# Patient Record
Sex: FEMALE | Race: WHITE
[De-identification: names, ages, dates, MRNs, and addresses within clinical notes are randomized per-mention and may not be internally consistent; named-entity substitution may affect disease eponyms.]

---

## 2017-11-05 ENCOUNTER — HOSPITAL ENCOUNTER (EMERGENCY)
Dept: HOSPITAL 10 - FTE | Age: 27
Discharge: HOME | End: 2017-11-05
Payer: COMMERCIAL

## 2017-11-05 VITALS — BODY MASS INDEX: 35.71 KG/M2 | WEIGHT: 154.32 LBS | HEIGHT: 55 IN

## 2017-11-05 DIAGNOSIS — R51: Primary | ICD-10-CM

## 2017-11-05 PROCEDURE — 99283 EMERGENCY DEPT VISIT LOW MDM: CPT

## 2017-11-05 NOTE — ERD
ER Documentation


Chief Complaint


Chief Complaint


POSSIBLE ALLERGIC REACTION TO NEW MEDICATION





HPI


Otherwise healthy 27-year-old female presenting with a chief complaint of 

headache and general fatigue.  Patient was evaluated yesterday diagnosed with 

vaginitis and given a prescription for metronidazole.  Patient believes her 

symptoms are secondary to metronidazole.  Is requesting work note.  States that 

she took Tylenol with minimal relief yesterday.  Has not taken any additional 

medications today to relieve symptoms.  Has not taken this medication before.  

No aggravating or alleviating factors.  Denies chest pain, body aches, 

meningismus, fever, chills, or abdominal pain.  Patient has no other complaints 

and describes no other associated manifestations.  Nursing notes have been 

reviewed and are consistent with history given.





ROS


All systems reviewed and are negative except as per history of present illness.





Medications


Home Meds


Active Scripts


Clindamycin Hcl* (Clindamycin Hcl*) 300 Mg Capsule, 300 MG PO BID for 7 Days, 

CAP


   Prov:YORDAN PEREZ PA-C         11/5/17





PMhx/Soc


Medical and Surgical Hx:  pt denies Medical Hx, pt denies Surgical Hx


Hx Alcohol Use:  No


Hx Substance Use:  No





Physical Exam


Vitals





Vital Signs








  Date Time  Temp Pulse Resp B/P Pulse Ox O2 Delivery O2 Flow Rate FiO2


 


11/5/17 17:32 98.1 71 18 149/71 98   








Physical Exam


Const: Healthy appearing 27-year-old female no acute distress


Head:   Atraumatic 


Eyes:    Normal Conjunctiva.  PERRLA, EOMI bilaterally.  Ophthalmoscope exam 

unremarkable.


ENT:    Normal External Ears, Nose and Mouth.


Neck:               Full range of motion..~ No meningismus.  No lymphadenopathy.


Resp:    Clear to auscultation bilaterally


Cardio:    Regular rate and rhythm, no murmurs.  Cap refill less than 2 

seconds.  Radial pulses 2+ bilaterally.


Abd:    Soft, non tender, non distended. Normal bowel sounds.  No suprapubic 

tenderness.


Skin:    No petechiae or rashes


Back:    No midline or flank tenderness.  No CVA tenderness.


Ext:    No cyanosis, or edema


Neur:    Awake and alert.  Neurovascularly intact.


Psych:    Normal Mood and Affect





Procedures/MDM


Patient presents with chief complaints of headache and general fatigue.  

Patient states that she was prescribed metronidazole and believes it is because 

that medication.  Patient is wanting a different medication and a work note.  

Denies alcohol use or drug abuse.  Patient will be given clindamycin with 

instructions for follow-up with PCP.  I have no concern for anemia, serious 

bacterial infection including PID, systemic involvement, or serious drug 

reaction or endangerment of airway.  I have spoke with the patient regarding 

their condition and future management. They have verbally responded that they 

understand their status and treatment plan. The patients vitals are stable, 

and their current condition is appropriate for discharge. The patient will be 

given discharge instructions with return precautions.





Discharge medications: Clindamycin 300 mg p.o.





Departure


Diagnosis:  


 Primary Impression:  


 Adverse drug effect


 Encounter type:  initial encounter  Qualified Code:  T88.7XXA - Adverse effect 

of drug, initial encounter


Condition:  Stable





Additional Instructions:  


Follow up with your PCP within the next 1-3 days for a more thorough evaluation 

and a possible referral to a specialist. Return the the emergency department 

immediately if symptoms worsen or change. If you have any questions regarding 

medications, ask your pharmacist or us before you leave. If any adverse 

reactions occur while taking your medications, discontinue the treatment and 

return to the emergency department immediately.  Take your medications as 

directed, and complete the entire course of treatment.











YORDAN PEREZ PA-C Nov 5, 2017 18:07

## 2019-01-11 ENCOUNTER — HOSPITAL ENCOUNTER (EMERGENCY)
Dept: HOSPITAL 91 - FTE | Age: 29
Discharge: HOME | End: 2019-01-11
Payer: COMMERCIAL

## 2019-01-11 ENCOUNTER — HOSPITAL ENCOUNTER (EMERGENCY)
Dept: HOSPITAL 10 - FTE | Age: 29
Discharge: HOME | End: 2019-01-11
Payer: COMMERCIAL

## 2019-01-11 VITALS — HEIGHT: 55 IN | BODY MASS INDEX: 36.22 KG/M2 | WEIGHT: 156.53 LBS

## 2019-01-11 VITALS — SYSTOLIC BLOOD PRESSURE: 125 MMHG | RESPIRATION RATE: 20 BRPM | DIASTOLIC BLOOD PRESSURE: 75 MMHG | HEART RATE: 65 BPM

## 2019-01-11 DIAGNOSIS — R42: Primary | ICD-10-CM

## 2019-01-11 LAB
ADD MAN DIFF?: NO
ADD UMIC: YES
ALANINE AMINOTRANSFERASE: 18 IU/L (ref 13–69)
ALBUMIN/GLOBULIN RATIO: 1.4
ALBUMIN: 5.2 G/DL (ref 3.3–4.9)
ALKALINE PHOSPHATASE: 76 IU/L (ref 42–121)
ANION GAP: 14 (ref 5–13)
ASPARTATE AMINO TRANSFERASE: 31 IU/L (ref 15–46)
BASOPHIL #: 0 10^3/UL (ref 0–0.1)
BASOPHILS %: 0.3 % (ref 0–2)
BILIRUBIN,DIRECT: 0 MG/DL (ref 0–0.2)
BILIRUBIN,TOTAL: 0.2 MG/DL (ref 0.2–1.3)
BLOOD UREA NITROGEN: 14 MG/DL (ref 7–20)
CALCIUM: 10.2 MG/DL (ref 8.4–10.2)
CARBON DIOXIDE: 27 MMOL/L (ref 21–31)
CHLORIDE: 101 MMOL/L (ref 97–110)
CREATININE: 0.61 MG/DL (ref 0.44–1)
EOSINOPHILS #: 0.1 10^3/UL (ref 0–0.5)
EOSINOPHILS %: 1.5 % (ref 0–7)
GLOBULIN: 3.7 G/DL (ref 1.3–3.2)
GLUCOSE: 93 MG/DL (ref 70–220)
HEMATOCRIT: 42.3 % (ref 37–47)
HEMOGLOBIN: 13.8 G/DL (ref 12–16)
IMMATURE GRANS #M: 0.03 10^3/UL (ref 0–0.03)
IMMATURE GRANS % (M): 0.3 % (ref 0–0.43)
LYMPHOCYTES #: 2.2 10^3/UL (ref 0.8–2.9)
LYMPHOCYTES %: 25.9 % (ref 15–51)
MEAN CORPUSCULAR HEMOGLOBIN: 29.4 PG (ref 29–33)
MEAN CORPUSCULAR HGB CONC: 32.6 G/DL (ref 32–37)
MEAN CORPUSCULAR VOLUME: 90 FL (ref 82–101)
MEAN PLATELET VOLUME: 11.7 FL (ref 7.4–10.4)
MONOCYTE #: 0.4 10^3/UL (ref 0.3–0.9)
MONOCYTES %: 5 % (ref 0–11)
NEUTROPHIL #: 5.7 10^3/UL (ref 1.6–7.5)
NEUTROPHILS %: 67 % (ref 39–77)
NUCLEATED RED BLOOD CELLS #: 0 10^3/UL (ref 0–0)
NUCLEATED RED BLOOD CELLS%: 0 /100WBC (ref 0–0)
PLATELET COUNT: 269 10^3/UL (ref 140–415)
POTASSIUM: 3.9 MMOL/L (ref 3.5–5.1)
RED BLOOD COUNT: 4.7 10^6/UL (ref 4.2–5.4)
RED CELL DISTRIBUTION WIDTH: 11.9 % (ref 11.5–14.5)
SODIUM: 142 MMOL/L (ref 135–144)
TOTAL PROTEIN: 8.9 G/DL (ref 6.1–8.1)
UR ASCORBIC ACID: NEGATIVE MG/DL
UR BILIRUBIN (DIP): NEGATIVE MG/DL
UR BLOOD (DIP): NEGATIVE MG/DL
UR CLARITY: (no result)
UR COLOR: YELLOW
UR GLUCOSE (DIP): NEGATIVE MG/DL
UR KETONES (DIP): (no result) MG/DL
UR LEUKOCYTE ESTERASE (DIP): (no result) LEU/UL
UR MUCUS: (no result) /HPF
UR NITRITE (DIP): NEGATIVE MG/DL
UR PH (DIP): 5 (ref 5–9)
UR RBC: 0 /HPF (ref 0–5)
UR SPECIFIC GRAVITY (DIP): 1.03 (ref 1–1.03)
UR SQUAMOUS EPITHELIAL CELL: (no result) /HPF
UR TOTAL PROTEIN (DIP): NEGATIVE MG/DL
UR UROBILINOGEN (DIP): NEGATIVE MG/DL
UR WBC: 2 /HPF (ref 0–5)
WHITE BLOOD COUNT: 8.6 10^3/UL (ref 4.8–10.8)

## 2019-01-11 PROCEDURE — 70450 CT HEAD/BRAIN W/O DYE: CPT

## 2019-01-11 PROCEDURE — 93005 ELECTROCARDIOGRAM TRACING: CPT

## 2019-01-11 PROCEDURE — 80053 COMPREHEN METABOLIC PANEL: CPT

## 2019-01-11 PROCEDURE — 85025 COMPLETE CBC W/AUTO DIFF WBC: CPT

## 2019-01-11 PROCEDURE — 81001 URINALYSIS AUTO W/SCOPE: CPT

## 2019-01-11 PROCEDURE — 99285 EMERGENCY DEPT VISIT HI MDM: CPT

## 2019-01-11 PROCEDURE — 81025 URINE PREGNANCY TEST: CPT

## 2019-01-11 RX ADMIN — CEPHALEXIN 1 MG: 500 CAPSULE ORAL at 19:53

## 2019-01-11 RX ADMIN — MECLIZINE 1 MG: 12.5 TABLET ORAL at 18:37

## 2019-01-11 NOTE — ERD
ER Documentation


Chief Complaint


Chief Complaint





DIZZINESS X 4 DAYS





HPI


28-year-old female presents with dizziness for last 4 days.  She describes it as


a spinning type dizziness.  She has mild nausea.  She denies fevers, vomiting, s


hortness breath or chest pain.  She has has a mild bitemporal headache.  She 


denies any history of trauma, additional symptoms.





ROS


All systems reviewed and are negative except as per history of present illness.





Medications


Home Meds


Active Scripts


Meclizine Hcl* (Meclizine Hcl*) 25 Mg Tablet, 25 MG PO Q8H PRN for DIZZINESS, 


#14 TAB


   Prov:GERA DAWSON MD         1/11/19


Cephalexin* (Keflex*) 500 Mg Capsule, 500 MG PO QID for 5 Days, CAP


   Prov:GERA DASWON MD         1/11/19


Clindamycin Hcl* (Clindamycin Hcl*) 300 Mg Capsule, 300 MG PO BID for 7 Days, 


CAP


   Prov:YORDAN PEREZ PA-C         11/5/17





PMhx/Soc


Hx Alcohol Use:  No


Hx Substance Use:  No


Hx Tobacco Use:  No


Smoking Status:  Never smoker





FmHx


Family History:  No diabetes, No coronary disease, No other





Physical Exam


Vitals





Vital Signs


  Date      Temp  Pulse  Resp  B/P (MAP)   Pulse Ox  O2          O2 Flow    FiO2


Time                                                 Delivery    Rate


   1/11/19  98.1     71    18      123/56        99


     16:03                           (78)





Physical Exam


Const:   No acute distress


Head:   Atraumatic 


Eyes:    Normal Conjunctiva


ENT:    Normal External Ears, Nose and Mouth.  TMs and oropharynx normal.


Neck:               Full range of motion. No meningismus.


Resp:   Clear to auscultation bilaterally


Cardio:   Regular rate and rhythm, no murmurs


Abd:    Soft, non tender, non distended. Normal bowel sounds


Skin:   No petechiae or rashes


Back:   No midline or flank tenderness


Ext:    No cyanosis, or edema


Neur:   Awake and alert.  No significant reproducible vertigo.  No cerebellar 


signs are pronator drift.  Normal gait.


Psych:    Normal Mood and Affect


Result Diagram:  


1/11/19 1829 1/11/19 1829





Results 24 hrs





Laboratory Tests


Test
                             1/11/19
18:19    1/11/19
18:23   1/11/19
18:29


POC Beta HCG, Qualitative         NEGATIVE


Urine Color                                      YELLOW


Urine Clarity
                    
              SLIGHTLY
CLOUDY  



Urine pH                                                    5.0


Urine Specific Gravity                                    1.026


Urine Ketones                                    TRACE mg/dL


Urine Nitrite                                    NEGATIVE mg/dL


Urine Bilirubin                                  NEGATIVE mg/dL


Urine Urobilinogen                               NEGATIVE mg/dL


Urine Leukocyte Esterase                              1+ [place]/ul


Urine Microscopic RBC                                    0 /HPF


Urine Microscopic WBC                                    2 /HPF


Urine Squamous Epithelial
Cells   
              FEW /HPF 
       



Urine Mucus                                      FEW /HPF


Urine Hemoglobin                                 NEGATIVE mg/dL


Urine Glucose                                    NEGATIVE mg/dL


Urine Total Protein                              NEGATIVE mg/dl


White Blood Count                                                   8.6 10^3/ul


Red Blood Count                                                    4.70 10^6/ul


Hemoglobin                                                            13.8 g/dl


Hematocrit                                                               42.3 %


Mean Corpuscular Volume                                                 90.0 fl


Mean Corpuscular Hemoglobin                                             29.4 pg


Mean Corpuscular                  
              
                   32.6 g/dl 



Hemoglobin
Concent


Red Cell Distribution Width                                              11.9 %


Platelet Count                                                      269 10^3/UL


Mean Platelet Volume                                                    11.7 fl


Immature Granulocytes %                                                 0.300 %


Neutrophils %                                                            67.0 %


Lymphocytes %                                                            25.9 %


Monocytes %                                                               5.0 %


Eosinophils %                                                             1.5 %


Basophils %                                                               0.3 %


Nucleated Red Blood Cells %                                         0.0 /100WBC


Immature Granulocytes #                                           0.030 10^3/ul


Neutrophils #                                                       5.7 10^3/ul


Lymphocytes #                                                       2.2 10^3/ul


Monocytes #                                                         0.4 10^3/ul


Eosinophils #                                                       0.1 10^3/ul


Basophils #                                                         0.0 10^3/ul


Nucleated Red Blood Cells #                                         0.0 10^3/ul


Sodium Level                                                         142 mmol/L


Potassium Level                                                      3.9 mmol/L


Chloride Level                                                       101 mmol/L


Carbon Dioxide Level                                                  27 mmol/L


Anion Gap                                                                    14


Blood Urea Nitrogen                                                    14 mg/dl


Creatinine                                                           0.61 mg/dl


Est Glomerular Filtrat            
              
                > 60 mL/min 



Rate
mL/min


Glucose Level                                                          93 mg/dl


Calcium Level                                                        10.2 mg/dl


Total Bilirubin                                                       0.2 mg/dl


Direct Bilirubin                                                     0.00 mg/dl


Indirect Bilirubin                                                    0.2 mg/dl


Aspartate Amino                   
              
                     31 IU/L 



Transf
(AST/SGOT)


Alanine                           
              
                     18 IU/L 



Aminotransferase
(ALT/SGPT)


Alkaline Phosphatase                                                    76 IU/L


Total Protein                                                          8.9 g/dl


Albumin                                                                5.2 g/dl


Globulin                                                              3.70 g/dl


Albumin/Globulin Ratio                                                     1.40





Current Medications


 Medications
   Dose
          Sig/Caity
       Start Time
   Status  Last


 (Trade)       Ordered        Route
 PRN     Stop Time              Admin
Dose


                              Reason                                Admin


 Meclizine      25 mg          ONCE  ONCE
    1/11/19       DC           1/11/19


HCl
                          PO
            18:30
                       18:37



(Antivert)                                   1/11/19 18:31








Procedures/MDM


CBC and CMP showed no acute significant acute abnormalities.  Urine shows slight


signs of urinary tract infection.  HCG is negative.





EKG: 


Rate/Rhythm:       Normal Sinus Rhythm.  Rate equals 62.


QRS, ST, T-waves:    No changes consistent w/ acute ischemia


Impression:      No evidence of ischemia or arrhythmia





CT brain shows no acute abnormalities.  Patient was given Keflex 500 mg by 


mouth.





She presents with a vertigo type dizziness of uncertain etiology for last 4 


days.  She may have labyrinthitis.  No evidence of CNS lesions, signs of 


significant abnormalities except for very mild signs of UTI.  She also has 


concentrated urine suggestive of mild dehydration.  Will treat with instructions


for fluids, Antivert, Keflex, primary care follow-up and return precautions.  


The patient was stable with no new complaints during the ER course. Clinically, 


there is no current evidence to suggest meningitis, sepsis, acute abdomen, 


pneumonia, stroke,  acute coronary syndrome, pulmonary embolism, aortic 


dissection or any other emergent condition appearing to require further 


evaluation or hospitalization.  Patient counseled regarding my diagnostic 


impression and care plan. Prior to discharge all questions answered. Pt agrees 


with treatment plan and understands strict return precautions. Pt is instructed 


to follow up with primary care provider within 24-48 hours. Precautionary ins


tructions provided including instructions to return to the ER if not improving 


or for any worsening or changing symptoms or concerns.





Departure


Diagnosis:  


   Primary Impression:  


   Dizziness


Condition:  Stable


Patient Instructions:  Urinary Tract Infections in Women, Inner Ear Problems: 


Causes of Dizziness (Vertigo), Dizziness, Unk Cause


Referrals:  


[facility] (PCP)





Additional Instructions:  


All examinations normal today except for mild findings of urine infection and 


slight dehydration.  May be labyrinthitis.  Recommend fluids at home, rest.  


Recheck for new or worsening symptoms with primary care doctor.











GERA DAWSON MD             Jan 11, 2019 19:45